# Patient Record
Sex: MALE | Race: WHITE
[De-identification: names, ages, dates, MRNs, and addresses within clinical notes are randomized per-mention and may not be internally consistent; named-entity substitution may affect disease eponyms.]

---

## 2018-02-01 ENCOUNTER — HOSPITAL ENCOUNTER (EMERGENCY)
Dept: HOSPITAL 8 - ED | Age: 23
Discharge: HOME | End: 2018-02-01
Payer: COMMERCIAL

## 2018-02-01 VITALS — DIASTOLIC BLOOD PRESSURE: 86 MMHG | SYSTOLIC BLOOD PRESSURE: 132 MMHG

## 2018-02-01 VITALS — BODY MASS INDEX: 44.64 KG/M2 | HEIGHT: 66 IN | WEIGHT: 277.78 LBS
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
## 2018-02-01 DIAGNOSIS — J20.8: ICD-10-CM
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
## 2018-02-01 DIAGNOSIS — S80.02XA: Primary | ICD-10-CM
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
## 2018-02-01 PROCEDURE — 71045 X-RAY EXAM CHEST 1 VIEW: CPT

## 2018-02-01 PROCEDURE — 99284 EMERGENCY DEPT VISIT MOD MDM: CPT

## 2018-11-23 ENCOUNTER — HOSPITAL ENCOUNTER (EMERGENCY)
Facility: MEDICAL CENTER | Age: 23
End: 2018-11-23
Attending: EMERGENCY MEDICINE

## 2018-11-23 VITALS
BODY MASS INDEX: 38.09 KG/M2 | HEART RATE: 72 BPM | DIASTOLIC BLOOD PRESSURE: 86 MMHG | TEMPERATURE: 98.5 F | SYSTOLIC BLOOD PRESSURE: 125 MMHG | OXYGEN SATURATION: 97 % | HEIGHT: 68 IN | WEIGHT: 251.32 LBS | RESPIRATION RATE: 16 BRPM
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
## 2018-11-23 DIAGNOSIS — L03.011 CELLULITIS OF FINGER OF RIGHT HAND: ICD-10-CM

## 2018-11-23 PROCEDURE — 99283 EMERGENCY DEPT VISIT LOW MDM: CPT

## 2018-11-23 RX ORDER — AMOXICILLIN AND CLAVULANATE POTASSIUM 875; 125 MG/1; MG/1
1 TABLET, FILM COATED ORAL 2 TIMES DAILY
Qty: 20 TAB | Refills: 0 | Status: SHIPPED | OUTPATIENT
Start: 2018-11-23 | End: 2018-12-01

## 2018-11-23 RX ORDER — SULFAMETHOXAZOLE AND TRIMETHOPRIM 800; 160 MG/1; MG/1
1 TABLET ORAL 2 TIMES DAILY
Qty: 20 TAB | Refills: 0 | Status: SHIPPED | OUTPATIENT
Start: 2018-11-23 | End: 2018-12-01

## 2018-11-23 ASSESSMENT — PAIN SCALES - GENERAL
PAINLEVEL_OUTOF10: 3
PAINLEVEL_OUTOF10: 3

## 2018-11-23 NOTE — ED NOTES
Presents describing  a cat bite on his right thumb approximately 3 days ago.  He C/O escalating pain and swelling.

## 2018-11-23 NOTE — ED NOTES
Pt given written and oral discharge instructions. Pt verbalized understanding of all instructions given. All questions answered. Pt given paper prescriptions as ordered and educated on use. Pt given f/u instructions and educated on s/s of when to return to the ER. Pt ambulating independently upon time of discharge in stable condition.

## 2018-11-23 NOTE — ED PROVIDER NOTES
"ED Provider Note    CHIEF COMPLAINT  Chief Complaint   Patient presents with   • Cat Bite        HPI  Yina Oconnor is a 23 y.o. male who presents to the ED complaining of a cat bite to the right thumb.  The patient states he was bit about 3 days ago noticed last night that got significantly bigger he actually was able to pop it and it was draining.  Now is complaining of pain to his right thumb denies fevers chills nausea vomiting or any other symptoms.    REVIEW OF SYSTEMS  See HPI for further details. All other systems are negative.     PAST MEDICAL HISTORY  History reviewed. No pertinent past medical history.    FAMILY HISTORY  History reviewed. No pertinent family history.  Patient's family history has been discussed and is been found to be noncontributory to his present illness  SOCIAL HISTORY  Social History     Social History   • Marital status: Single     Spouse name: N/A   • Number of children: N/A   • Years of education: N/A     Social History Main Topics   • Smoking status: Never Smoker   • Smokeless tobacco: Never Used   • Alcohol use Yes      Comment: Occasionally   • Drug use: No   • Sexual activity: Not on file     Other Topics Concern   • Not on file     Social History Narrative    ** Merged History Encounter **                SURGICAL HISTORY  History reviewed. No pertinent surgical history.    CURRENT MEDICATIONS   Home Medications    **Home medications have not yet been reviewed for this encounter**       None  ALLERGIES   Allergies   Allergen Reactions   • Zinc Swelling       PHYSICAL EXAM  VITAL SIGNS: /93   Pulse 78   Temp 36.6 °C (97.8 °F) (Temporal)   Resp 16   Ht 1.727 m (5' 8\")   Wt 114 kg (251 lb 5.2 oz)   SpO2 98%   BMI 38.21 kg/m²    Pulse Ox interpretation nonhypoxic    Constitutional: Well developed, Well nourished, No acute distress, Non-toxic appearance.   Abdomen: Soft, nontender nondistended. Bowel sounds are present.   Skin: Warm, Dry, No erythema, "   Musculoskeletal: Intact distal pulses, no clubbing, no cyanosis, no edema right thumb at the mid aspect of the thumb itself there is a small puncture wound with a surrounding amount of cellulitis is tender to palpation but the thumb itself is otherwise normal is good flexion-extension has no tenderness over the flexor aspect  Neurologic: Alert & oriented x 3, Normal motor function, Normal sensory function, No focal deficits noted.     RADIOLOGY/PROCEDURES  No orders to display         COURSE & MEDICAL DECISION MAKING  Pertinent Labs & Imaging studies reviewed. (See chart for details)  Patient presents for evaluation.  Currently does have a small area of cellulitis I will start the patient on Augmentin and Bactrim for this.  Patient to continue with warm soaks wound care treatment.  If he gets sniffily worse he is to return back to the ED for reevaluation.  Patient is to follow the primary care physician as needed.    FINAL IMPRESSION  1. Cat bite, initial encounter    2. Cellulitis of finger of right hand           The patient will return for new or worsening symptoms and is stable at the time of discharge.    The patient is referred to a primary physician for blood pressure management, diabetic screening, and for all other preventative health concerns.        DISPOSITION:  Patient will be discharged home in stable condition.    FOLLOW UP:  [facility]  [facility] [facility] [address]  [facility] [address]  [phone]          [address] [address]  [phone]          OUTPATIENT MEDICATIONS:  Discharge Medication List as of 11/23/2018  3:34 PM      START taking these medications    Details   amoxicillin-clavulanate (AUGMENTIN) 875-125 MG Tab Take 1 Tab by mouth 2 times a day for 10 days., Disp-20 Tab, R-0, Print Rx Paper      sulfamethoxazole-trimethoprim (BACTRIM DS) 800-160 MG tablet Take 1 Tab by mouth 2 Times a Day for 10 days., Disp-20 Tab, R-0, Print Rx Paper                      Electronically signed by: Dimitri Mc, 11/23/2018 3:35 PM

## 2018-12-01 ENCOUNTER — HOSPITAL ENCOUNTER (EMERGENCY)
Facility: MEDICAL CENTER | Age: 23
End: 2018-12-01
Attending: EMERGENCY MEDICINE

## 2018-12-01 VITALS
BODY MASS INDEX: 38.72 KG/M2 | HEART RATE: 74 BPM | DIASTOLIC BLOOD PRESSURE: 77 MMHG | HEIGHT: 68 IN | TEMPERATURE: 99.2 F | RESPIRATION RATE: 20 BRPM | WEIGHT: 255.51 LBS | SYSTOLIC BLOOD PRESSURE: 127 MMHG | OXYGEN SATURATION: 96 %

## 2018-12-01 DIAGNOSIS — Z51.89 ENCOUNTER FOR WOUND RE-CHECK: ICD-10-CM
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
## 2018-12-01 DIAGNOSIS — Z76.0 MEDICATION REFILL: ICD-10-CM

## 2018-12-01 PROCEDURE — 99283 EMERGENCY DEPT VISIT LOW MDM: CPT

## 2018-12-01 RX ORDER — SULFAMETHOXAZOLE AND TRIMETHOPRIM 800; 160 MG/1; MG/1
1 TABLET ORAL 2 TIMES DAILY
Qty: 14 TAB | Refills: 0 | Status: SHIPPED | OUTPATIENT
Start: 2018-12-01 | End: 2018-12-08

## 2018-12-01 RX ORDER — AMOXICILLIN AND CLAVULANATE POTASSIUM 875; 125 MG/1; MG/1
1 TABLET, FILM COATED ORAL 2 TIMES DAILY
Qty: 14 TAB | Refills: 0 | Status: SHIPPED | OUTPATIENT
Start: 2018-12-01 | End: 2018-12-08

## 2018-12-01 ASSESSMENT — PAIN SCALES - GENERAL: PAINLEVEL_OUTOF10: 0

## 2018-12-02 NOTE — ED TRIAGE NOTES
"Chief Complaint   Patient presents with   • Medication Refill     pt states his antiboiotics are missing and needs more, was supposed to take antibiotic (unsure which one) but only took for 5-7 days, had tenisha bite about 2 wks ago   /76   Pulse 99   Temp 36.4 °C (97.6 °F) (Temporal)   Resp 16   Ht 1.727 m (5' 8\")   Wt 115.9 kg (255 lb 8.2 oz)   SpO2 96%   BMI 38.85 kg/m²       "

## 2018-12-02 NOTE — ED PROVIDER NOTES
ED Provider Note    CHIEF COMPLAINT   Chief Complaint   Patient presents with   • Medication Refill     pt states his antiboiotics are missing and needs more, was supposed to take antibiotic (unsure which one) but only took for 5-7 days, had tenisha bite about 2 wks ago       HPI   Yina Oconnor is a 23 y.o. male who presents asking for refill of his antibiotics.  The patient suffered a cat bite to the right hand about 1-1/2-2 weeks ago.  He says he took his antibiotics for about 7 days, then lost the rest of them.  He last took the antibiotics 2 days ago.  The patient says he woke up this morning and is hand and thumb were quite swollen but this resolved after about 3 hours.  He denies any significant pain to the thumb or hand at this time.  He said he developed a fever 2 days ago and was sent home from work.  He says today he has had no further fever.  He denies any chills, sore throat, cough, congestion, or any difficulty breathing.  He has had no nausea, vomiting, or diarrhea.  He denies any urinary symptoms.  The patient says he would likely go back to work and needs a work note.  He would also like to finish his antibiotics as he was concerned the infection was coming back because of the swelling earlier today.    REVIEW OF SYSTEMS   See HPI for further details. All other systems are negative.     PAST MEDICAL HISTORY   History reviewed. No pertinent past medical history.    FAMILY HISTORY  History reviewed. No pertinent family history.    SOCIAL HISTORY  Social History     Social History   • Marital status: Single     Spouse name: N/A   • Number of children: N/A   • Years of education: N/A     Social History Main Topics   • Smoking status: Current Every Day Smoker     Packs/day: 0.25   • Smokeless tobacco: Never Used   • Alcohol use Yes      Comment: couple drinks per month   • Drug use: No   • Sexual activity: Not on file     Other Topics Concern   • Not on file     Social History Narrative    ** Merged  "History Encounter **            SURGICAL HISTORY  History reviewed. No pertinent surgical history.    CURRENT MEDICATIONS   Home Medications     Reviewed by Sia Martinez R.N. (Registered Nurse) on 12/01/18 at 1845  Med List Status: Partial   Medication Last Dose Status   amoxicillin-clavulanate (AUGMENTIN) 875-125 MG Tab 11/28/2018 Active   hydrocodone-acetaminophen (NORCO) 5-325 MG TABS per tablet  Active   sulfamethoxazole-trimethoprim (BACTRIM DS) 800-160 MG tablet 11/28/2018 Active                ALLERGIES   Allergies   Allergen Reactions   • Zinc Swelling       PHYSICAL EXAM  VITAL SIGNS: /76   Pulse 99   Temp 36.4 °C (97.6 °F) (Temporal)   Resp 16   Ht 1.727 m (5' 8\")   Wt 115.9 kg (255 lb 8.2 oz)   SpO2 96%   BMI 38.85 kg/m²   Constitutional: Well developed, Well nourished, No acute distress, Non-toxic appearance.   Extremities: Intact peripheral pulses, no edema.  Exam is notable for the right upper extremity.  On the volar surface of the right thumb just below the interphalangeal crease, there is a small 1 cm slightly erythematous circular area.  There is no increased warmth or induration.  There is good range of motion on flexion/extension at the interphalangeal joint and metacarpophalangeal joint.  Over the volar surface of the wrist there are two faded erythematous areas about 2 cm in diameter.  No induration, increased warmth, or tenderness.  There is good range of motion on flexion/extension of all fingers at all joints, and to the wrist and elbow.         RADIOLOGY/PROCEDURES      COURSE & MEDICAL DECISION MAKING  Pertinent Labs & Imaging studies reviewed. (See chart for details)  The patient presents with the above complaints.  At this time his wound appears to be healing well.  There is no evidence of any significant cellulitis or tenosynovitis.  I am uncertain what caused his fever 2 days ago.  He has had no respiratory GI symptoms.  The patient says he is feeling much better and " would like a note to go back to work.  I will continue the patient on a one-week course of Augmentin and Bactrim which he was on previously to complete his course of antibiotics.  Patient is told return to the ER for any worsening pain, redness, swelling, fever, or any other problems.  He is given a work release note.    FINAL IMPRESSION  1.  Bite to the right hand  2.  Wound recheck  3.      Electronically signed by: Igor Kumar, 12/1/2018 8:02 PM

## 2018-12-02 NOTE — ED NOTES
Pt given discharge paperwork and prescriptions, pt verbalized understanding all information given, Pt ambulated out of the ER without difficulty.

## 2019-03-16 ENCOUNTER — APPOINTMENT (OUTPATIENT)
Dept: RADIOLOGY | Facility: MEDICAL CENTER | Age: 24
End: 2019-03-16
Attending: EMERGENCY MEDICINE

## 2019-03-16 ENCOUNTER — HOSPITAL ENCOUNTER (EMERGENCY)
Facility: MEDICAL CENTER | Age: 24
End: 2019-03-16
Attending: EMERGENCY MEDICINE

## 2019-03-16 VITALS
HEART RATE: 85 BPM | OXYGEN SATURATION: 94 % | HEIGHT: 68 IN | SYSTOLIC BLOOD PRESSURE: 113 MMHG | DIASTOLIC BLOOD PRESSURE: 66 MMHG | WEIGHT: 257.94 LBS | TEMPERATURE: 97.6 F | BODY MASS INDEX: 39.09 KG/M2 | RESPIRATION RATE: 18 BRPM

## 2019-03-16 DIAGNOSIS — R19.7 VOMITING AND DIARRHEA: ICD-10-CM

## 2019-03-16 DIAGNOSIS — R10.11 RUQ ABDOMINAL PAIN: ICD-10-CM

## 2019-03-16 DIAGNOSIS — R11.10 VOMITING AND DIARRHEA: ICD-10-CM

## 2019-03-16 LAB
ALBUMIN SERPL BCP-MCNC: 4.3 G/DL (ref 3.2–4.9)
ALBUMIN/GLOB SERPL: 1.4 G/DL
ALP SERPL-CCNC: 71 U/L (ref 30–99)
ALT SERPL-CCNC: 21 U/L (ref 2–50)
ANION GAP SERPL CALC-SCNC: 9 MMOL/L (ref 0–11.9)
APPEARANCE UR: CLEAR
AST SERPL-CCNC: 19 U/L (ref 12–45)
BASOPHILS # BLD AUTO: 0.6 % (ref 0–1.8)
BASOPHILS # BLD: 0.05 K/UL (ref 0–0.12)
BILIRUB SERPL-MCNC: 1.7 MG/DL (ref 0.1–1.5)
BILIRUB UR QL STRIP.AUTO: NEGATIVE
BUN SERPL-MCNC: 14 MG/DL (ref 8–22)
CALCIUM SERPL-MCNC: 9.1 MG/DL (ref 8.4–10.2)
CHLORIDE SERPL-SCNC: 105 MMOL/L (ref 96–112)
CO2 SERPL-SCNC: 23 MMOL/L (ref 20–33)
COLOR UR: YELLOW
CREAT SERPL-MCNC: 0.98 MG/DL (ref 0.5–1.4)
EOSINOPHIL # BLD AUTO: 0.14 K/UL (ref 0–0.51)
EOSINOPHIL NFR BLD: 1.8 % (ref 0–6.9)
ERYTHROCYTE [DISTWIDTH] IN BLOOD BY AUTOMATED COUNT: 37.7 FL (ref 35.9–50)
FLUAV RNA SPEC QL NAA+PROBE: NEGATIVE
FLUBV RNA SPEC QL NAA+PROBE: NEGATIVE
GLOBULIN SER CALC-MCNC: 3 G/DL (ref 1.9–3.5)
GLUCOSE SERPL-MCNC: 99 MG/DL (ref 65–99)
GLUCOSE UR STRIP.AUTO-MCNC: NEGATIVE MG/DL
HCT VFR BLD AUTO: 50.5 % (ref 42–52)
HGB BLD-MCNC: 17.8 G/DL (ref 14–18)
IMM GRANULOCYTES # BLD AUTO: 0.04 K/UL (ref 0–0.11)
IMM GRANULOCYTES NFR BLD AUTO: 0.5 % (ref 0–0.9)
KETONES UR STRIP.AUTO-MCNC: NEGATIVE MG/DL
LEUKOCYTE ESTERASE UR QL STRIP.AUTO: NEGATIVE
LIPASE SERPL-CCNC: 35 U/L (ref 7–58)
LYMPHOCYTES # BLD AUTO: 1.76 K/UL (ref 1–4.8)
LYMPHOCYTES NFR BLD: 22.8 % (ref 22–41)
MCH RBC QN AUTO: 29.2 PG (ref 27–33)
MCHC RBC AUTO-ENTMCNC: 35.2 G/DL (ref 33.7–35.3)
MCV RBC AUTO: 82.8 FL (ref 81.4–97.8)
MICRO URNS: NORMAL
MONOCYTES # BLD AUTO: 0.54 K/UL (ref 0–0.85)
MONOCYTES NFR BLD AUTO: 7 % (ref 0–13.4)
NEUTROPHILS # BLD AUTO: 5.2 K/UL (ref 1.82–7.42)
NEUTROPHILS NFR BLD: 67.3 % (ref 44–72)
NITRITE UR QL STRIP.AUTO: NEGATIVE
NRBC # BLD AUTO: 0 K/UL
NRBC BLD-RTO: 0 /100 WBC
PH UR STRIP.AUTO: 7 [PH]
PLATELET # BLD AUTO: 216 K/UL (ref 164–446)
PMV BLD AUTO: 9.9 FL (ref 9–12.9)
POTASSIUM SERPL-SCNC: 3.8 MMOL/L (ref 3.6–5.5)
PROT SERPL-MCNC: 7.3 G/DL (ref 6–8.2)
PROT UR QL STRIP: NEGATIVE MG/DL
RBC # BLD AUTO: 6.1 M/UL (ref 4.7–6.1)
RBC UR QL AUTO: NEGATIVE
SODIUM SERPL-SCNC: 137 MMOL/L (ref 135–145)
SP GR UR STRIP.AUTO: 1.01
WBC # BLD AUTO: 7.7 K/UL (ref 4.8–10.8)

## 2019-03-16 PROCEDURE — 85025 COMPLETE CBC W/AUTO DIFF WBC: CPT

## 2019-03-16 PROCEDURE — 96375 TX/PRO/DX INJ NEW DRUG ADDON: CPT

## 2019-03-16 PROCEDURE — 36415 COLL VENOUS BLD VENIPUNCTURE: CPT

## 2019-03-16 PROCEDURE — 83690 ASSAY OF LIPASE: CPT

## 2019-03-16 PROCEDURE — 81003 URINALYSIS AUTO W/O SCOPE: CPT

## 2019-03-16 PROCEDURE — 700111 HCHG RX REV CODE 636 W/ 250 OVERRIDE (IP): Performed by: EMERGENCY MEDICINE

## 2019-03-16 PROCEDURE — 700105 HCHG RX REV CODE 258: Performed by: EMERGENCY MEDICINE

## 2019-03-16 PROCEDURE — 80053 COMPREHEN METABOLIC PANEL: CPT

## 2019-03-16 PROCEDURE — 76705 ECHO EXAM OF ABDOMEN: CPT

## 2019-03-16 PROCEDURE — 96374 THER/PROPH/DIAG INJ IV PUSH: CPT

## 2019-03-16 PROCEDURE — 99285 EMERGENCY DEPT VISIT HI MDM: CPT

## 2019-03-16 PROCEDURE — 87502 INFLUENZA DNA AMP PROBE: CPT

## 2019-03-16 RX ORDER — KETOROLAC TROMETHAMINE 30 MG/ML
15 INJECTION, SOLUTION INTRAMUSCULAR; INTRAVENOUS ONCE
Status: COMPLETED | OUTPATIENT
Start: 2019-03-16 | End: 2019-03-16

## 2019-03-16 RX ORDER — ONDANSETRON 4 MG/1
4 TABLET, ORALLY DISINTEGRATING ORAL EVERY 6 HOURS PRN
Qty: 10 TAB | Refills: 0 | Status: SHIPPED | OUTPATIENT
Start: 2019-03-16 | End: 2019-06-17

## 2019-03-16 RX ORDER — ONDANSETRON 2 MG/ML
4 INJECTION INTRAMUSCULAR; INTRAVENOUS ONCE
Status: COMPLETED | OUTPATIENT
Start: 2019-03-16 | End: 2019-03-16

## 2019-03-16 RX ORDER — SODIUM CHLORIDE 9 MG/ML
1000 INJECTION, SOLUTION INTRAVENOUS ONCE
Status: COMPLETED | OUTPATIENT
Start: 2019-03-16 | End: 2019-03-16

## 2019-03-16 RX ADMIN — SODIUM CHLORIDE 1000 ML: 9 INJECTION, SOLUTION INTRAVENOUS at 18:49

## 2019-03-16 RX ADMIN — ONDANSETRON 4 MG: 2 INJECTION INTRAMUSCULAR; INTRAVENOUS at 18:49

## 2019-03-16 RX ADMIN — KETOROLAC TROMETHAMINE 15 MG: 30 INJECTION, SOLUTION INTRAMUSCULAR; INTRAVENOUS at 18:49

## 2019-03-16 ASSESSMENT — ENCOUNTER SYMPTOMS
SORE THROAT: 1
EYE REDNESS: 0
NECK PAIN: 0
ABDOMINAL PAIN: 1
HEADACHES: 1
BLURRED VISION: 0
SEIZURES: 0
CHILLS: 0
NAUSEA: 1
FOCAL WEAKNESS: 0
VOMITING: 1
COUGH: 1
BACK PAIN: 0
SHORTNESS OF BREATH: 0
FEVER: 1

## 2019-03-17 NOTE — ED TRIAGE NOTES
"Pt presents complaining of acute onset of RUQ abdominal pain with associated episodic N/V/D persisting for the past 4 days.    Chief Complaint   Patient presents with   • Fever   • Nausea/Vomiting/Diarrhea   • RUQ Pain     /69   Pulse 80   Temp 36.4 °C (97.6 °F) (Temporal)   Resp 20   Ht 1.727 m (5' 8\")   Wt 117 kg (257 lb 15 oz)   SpO2 99%   BMI 39.22 kg/m²     "

## 2019-03-17 NOTE — DISCHARGE INSTRUCTIONS
You were seen in the Emergency Department for abdominal pain, vomiting and diarrhea, likely due to viral illness.    Labs, urine test, ultrasound were completed without significant acute abnormalities.    Please use 1,000mg of tylenol or 600mg of ibuprofen every 6 hours as needed for pain.  Please drink plenty of fluids.    Please follow up with your primary care physician.    Return to the Emergency Department with persistent fevers greater than 100.4, worsening abdominal pain, persistent vomiting, or other concerns.

## 2019-03-17 NOTE — ED PROVIDER NOTES
ED Provider Note    CHIEF COMPLAINT  Chief Complaint   Patient presents with   • Fever   • Nausea/Vomiting/Diarrhea   • RUQ Pain       HPI  Yina Sierra is a 23 y.o. otherwise healthy male who presents with abdominal pain, vomiting and diarrhea.  Patient states he has been sick for the last 5 days.  He endorses fevers at home as high as 101 on Thursday.  He describes pain mainly located to the right upper quadrant however radiating across the left upper quadrant that is sharp and cramping in nature.  He endorses multiple episodes of vomiting and diarrhea, some mild dysuria.  He has had a cough and sore throat as well.  No known sick contacts.  Patient states he did receive a flu vaccination this year.  He also states that he has been using psychedelic mushrooms frequently recently.  He states the week before last he use them every day for a week straight.  Other than marijuana, he denies other drug use.    REVIEW OF SYSTEMS  See HPI for further details.   Review of Systems   Constitutional: Positive for fever. Negative for chills.   HENT: Positive for sore throat.    Eyes: Negative for blurred vision and redness.   Respiratory: Positive for cough. Negative for shortness of breath.    Cardiovascular: Negative for chest pain and leg swelling.   Gastrointestinal: Positive for abdominal pain, nausea and vomiting.   Genitourinary: Positive for dysuria. Negative for urgency.   Musculoskeletal: Negative for back pain and neck pain.   Skin: Negative for rash.   Neurological: Positive for headaches. Negative for focal weakness and seizures.   Psychiatric/Behavioral: Negative for suicidal ideas.         PAST MEDICAL HISTORY       SOCIAL HISTORY  Social History     Social History Main Topics   • Smoking status: Current Every Day Smoker     Packs/day: 0.25     Types: Cigarettes   • Smokeless tobacco: Never Used   • Alcohol use Yes      Comment: Occasionally   • Drug use: No   • Sexual activity: Not on file       SURGICAL  "HISTORY  patient denies any surgical history    CURRENT MEDICATIONS  Home Medications     Reviewed by Jonatan Cleaning R.N. (Registered Nurse) on 03/16/19 at 1818  Med List Status: Not Addressed   Medication Last Dose Status   hydrocodone-acetaminophen (NORCO) 5-325 MG TABS per tablet Not taking Active                ALLERGIES  Allergies   Allergen Reactions   • Zinc Swelling       PHYSICAL EXAM   VITAL SIGNS: /66   Pulse 85   Temp 36.4 °C (97.6 °F) (Temporal)   Resp 18   Ht 1.727 m (5' 8\")   Wt 117 kg (257 lb 15 oz)   SpO2 94%   BMI 39.22 kg/m²      Physical Exam   Constitutional: He is oriented to person, place, and time and well-developed, well-nourished, and in no distress. No distress.   Nontoxic appearing young male   HENT:   Head: Normocephalic and atraumatic.   Eyes: Pupils are equal, round, and reactive to light. Conjunctivae are normal.   Neck: Normal range of motion. Neck supple.   Cardiovascular: Normal rate, regular rhythm and normal heart sounds.    Pulmonary/Chest: Effort normal and breath sounds normal. No respiratory distress.   Abdominal: Soft. He exhibits no distension. There is tenderness. There is guarding. There is no rebound.   Tenderness to palpation in bilateral upper quadrants, worse on the right than the left with voluntary guarding   Musculoskeletal: Normal range of motion. He exhibits no edema or tenderness.   Neurological: He is alert and oriented to person, place, and time.   Moving all extremities spontaneously   Skin: Skin is warm and dry.   Psychiatric: Affect normal.         DIAGNOSTIC STUDIES      LABS  Personally reviewed by me  Labs Reviewed   COMP METABOLIC PANEL - Abnormal; Notable for the following:        Result Value    Total Bilirubin 1.7 (*)     All other components within normal limits   CBC WITH DIFFERENTIAL   LIPASE   URINALYSIS,CULTURE IF INDICATED   INFLUENZA A/B BY PCR   ESTIMATED GFR   INFLUENZA RAPID           RADIOLOGY  Personally reviewed by " "me  US-RUQ   Final Result      1.  Fatty infiltration of liver and mild hepatomegaly      2.  Negative for gallstones or biliary dilation          ED COURSE  Vitals:    03/16/19 1813 03/16/19 1814 03/16/19 2015   BP:  129/69 113/66   Pulse:  80 85   Resp:  20 18   Temp:  36.4 °C (97.6 °F)    TempSrc:  Temporal    SpO2:  99% 94%   Weight: 117 kg (257 lb 15 oz)     Height: 1.727 m (5' 8\")           Medications administered:  Medications   NS infusion 1,000 mL (0 mL Intravenous Stopped 3/16/19 1900)   ondansetron (ZOFRAN) syringe/vial injection 4 mg (4 mg Intravenous Given 3/16/19 1849)   ketorolac (TORADOL) injection 15 mg (15 mg Intravenous Given 3/16/19 1849)     Patient was given IV fluids for vomiting and possible dehydration.  IV hydration was used because oral hydration was not adequate alone.  Following fluid administration patient's symptoms and hydration status were improved.      MEDICAL DECISION MAKING  Otherwise healthy patient presents with 4 to 5-day history of vomiting, diarrhea, and abdominal pain.  He is afebrile with normal vitals on arrival and nontoxic-appearing.  Abdominal exam is not concerning for bowel obstruction or perforation, appendicitis, or diverticulitis.  Labs are reassuring without evidence of leukocytosis.  There is no evidence of transaminitis or elevated lipase concerning for pancreatitis.  Ultrasound was performed without evidence of cholelithiasis or cholecystitis.  Urinalysis is negative for signs of infection.  Influenza test is negative.  I suspect the patient's symptoms are due to a viral illness.  We will plan for symptom control followed by reassessment.    Upon reassessment, patient is resting comfortably with normal vital signs.  No new complaints at this time.  Repeat abdominal exam at this time is benign.  He is tolerating water without difficulty.  Discussed results with patient and/or family as well as importance of primary care follow up.  Patient understands plan of " care and strict return precautions for new or changing symptoms.       IMPRESSION  (R10.11) RUQ abdominal pain  (R11.10,  R19.7) Vomiting and diarrhea    Disposition: Discharge home, stable condition  Results, diagnoses, and treatment options were discussed with the patient and/or family. Patient verbalized understanding of plan of care.    Patient referred to primary care provider for monitoring and treatment of blood pressure.      Discharge Medication List as of 3/16/2019  8:25 PM      START taking these medications    Details   ondansetron (ZOFRAN ODT) 4 MG TABLET DISPERSIBLE Take 1 Tab by mouth every 6 hours as needed for Nausea., Disp-10 Tab, R-0, Print Rx Paper                 Electronically signed by: Jossie Mix, 3/16/2019 6:42 PM

## 2019-06-17 ENCOUNTER — HOSPITAL ENCOUNTER (EMERGENCY)
Facility: MEDICAL CENTER | Age: 24
End: 2019-06-17
Attending: EMERGENCY MEDICINE

## 2019-06-17 VITALS
SYSTOLIC BLOOD PRESSURE: 131 MMHG | DIASTOLIC BLOOD PRESSURE: 81 MMHG | HEIGHT: 67 IN | BODY MASS INDEX: 38.06 KG/M2 | TEMPERATURE: 97.2 F | OXYGEN SATURATION: 96 % | HEART RATE: 87 BPM | WEIGHT: 242.51 LBS | RESPIRATION RATE: 16 BRPM

## 2019-06-17 DIAGNOSIS — R19.7 VOMITING AND DIARRHEA: ICD-10-CM

## 2019-06-17 DIAGNOSIS — R11.10 VOMITING AND DIARRHEA: ICD-10-CM

## 2019-06-17 PROCEDURE — 700111 HCHG RX REV CODE 636 W/ 250 OVERRIDE (IP): Performed by: EMERGENCY MEDICINE

## 2019-06-17 PROCEDURE — 99284 EMERGENCY DEPT VISIT MOD MDM: CPT

## 2019-06-17 RX ORDER — ONDANSETRON 4 MG/1
4 TABLET, ORALLY DISINTEGRATING ORAL ONCE
Status: COMPLETED | OUTPATIENT
Start: 2019-06-17 | End: 2019-06-17

## 2019-06-17 RX ORDER — ONDANSETRON 4 MG/1
4 TABLET, ORALLY DISINTEGRATING ORAL EVERY 8 HOURS PRN
Qty: 10 TAB | Refills: 0 | Status: SHIPPED | OUTPATIENT
Start: 2019-06-17 | End: 2019-12-11

## 2019-06-17 RX ADMIN — ONDANSETRON 4 MG: 4 TABLET, ORALLY DISINTEGRATING ORAL at 07:53

## 2019-06-17 NOTE — ED NOTES
Reviewed discharge instruction and printed prescription for Zofran w/ pt, verbalized understanding to information provided including return precautions, denied questions/concerns.  Pt denied c/o nausea or pain at time of discharge.  Pt ambulated from ED w/ friend.

## 2019-06-17 NOTE — ED NOTES
Pt medicated w/ Zofran after completing phone conversation w/ friend.  Pt currently rates pain at 2/10.

## 2019-06-17 NOTE — ED PROVIDER NOTES
"ED Provider Note    CHIEF COMPLAINT  Abdominal pain      HPI  Yina Edgar is a 23 y.o. male who presents with abdominal pain.  Started about 2 this morning while he was at work.  Cramping character.  Seems to wax and wane.  He points to his upper abdomen as the location for the pain.  It is not worse on the right or the left.  He has not had a fever or chills, but he always feels hot while he is work.  There is associated nausea.  He has vomited multiple times.  He has had at least 6 episodes of watery diarrhea.  No blood in the emesis and it appeared to be everything that he had eaten.  Denies chest or shortness of breath.  He denies any abnormal foods, but he reports he may have been having more sweets than normal over the last week.  No known ill exposure.    Medical record reviewed patient had a visit to the emergency department in March of this year with right upper quadrant abdominal pain.  Source of symptoms was not identified.    REVIEW OF SYSTEMS  As above  All other systems are negative.     PAST MEDICAL HISTORY  Denies chronic medical problems    FAMILY HISTORY  History reviewed. No pertinent family history.    SOCIAL HISTORY  Positive tobacco.  Positive marijuana daily    SURGICAL HISTORY  History reviewed. No pertinent surgical history.    CURRENT MEDICATIONS  Home Medications    **Home medications have not yet been reviewed for this encounter**         ALLERGIES  Allergies   Allergen Reactions   • Zinc Swelling       PHYSICAL EXAM  VITAL SIGNS: /81   Pulse 100   Temp 36.9 °C (98.5 °F) (Temporal)   Resp 16   Ht 1.702 m (5' 7\")   Wt 110 kg (242 lb 8.1 oz)   SpO2 96%   BMI 37.98 kg/m²   Constitutional: Awake and alert  HENT: Moist mucous membranes  Eyes: Sclera white  Neck: Normal range of motion  Cardiovascular: Tachycardic  heart rate, Normal rhythm  Thorax & Lungs: Normal breath sounds, No respiratory distress, No wheezing, No chest tenderness.   Abdomen: Soft, nondistended, reports " discomfort throughout with palpation.  No focal tenderness.  No rebound or peritonitis  Skin: No rash.   Back: No tenderness, No CVA tenderness.   Extremities: Intact, symmetric distal pulses, no edema.  Neurologic: Grossly normal    COURSE & MEDICAL DECISION MAKING  Young male presents with vomiting and diarrhea with nonlocalized abdominal pain.  He does not have tenderness in his right lower quadrant.  He is afebrile.  No distention or suggestion of obstruction or surgical abdomen.  He was given oral Zofran in the ER.  Observed.  He is drinking liquids and ate some crackers.  Reports that he felt much better.  At this point there is no indication for immediate emergency department work-up.  He does present very early in the course of his illness and understands that if he has localization of pain to the right lower quadrant, fevers or no improvement within the next 24 hours he needs to return for recheck.  I have given a prescription for Zofran for nausea.  Advised plenty fluids and bland diet.    FINAL IMPRESSION  1.  Abdominal pain, unspecified  2.  Vomiting and diarrhea        This dictation was created using voice recognition software. The accuracy of the dictation is limited to the abilities of the software.  The nursing notes were reviewed and certain aspects of this information were incorporated into this note.    Electronically signed by: Sanjay Green, 6/17/2019 8:03 AM

## 2019-06-17 NOTE — ED TRIAGE NOTES
Chief Complaint   Patient presents with   • Abdominal Pain     low abd pain with an onset at 2 am, + vomiting, + diarrhea

## 2019-07-20 ENCOUNTER — HOSPITAL ENCOUNTER (EMERGENCY)
Facility: MEDICAL CENTER | Age: 24
End: 2019-07-20
Attending: EMERGENCY MEDICINE

## 2019-07-20 ENCOUNTER — APPOINTMENT (OUTPATIENT)
Dept: RADIOLOGY | Facility: MEDICAL CENTER | Age: 24
End: 2019-07-20
Attending: EMERGENCY MEDICINE

## 2019-07-20 VITALS
DIASTOLIC BLOOD PRESSURE: 82 MMHG | HEART RATE: 86 BPM | SYSTOLIC BLOOD PRESSURE: 121 MMHG | TEMPERATURE: 98 F | HEIGHT: 67 IN | BODY MASS INDEX: 36.23 KG/M2 | WEIGHT: 230.82 LBS | RESPIRATION RATE: 18 BRPM | OXYGEN SATURATION: 95 %

## 2019-07-20 DIAGNOSIS — R10.30 LOWER ABDOMINAL PAIN: ICD-10-CM

## 2019-07-20 DIAGNOSIS — N50.819 TESTICLE PAIN: ICD-10-CM

## 2019-07-20 LAB
ANION GAP SERPL CALC-SCNC: 4 MMOL/L (ref 0–11.9)
APPEARANCE UR: CLEAR
BASOPHILS # BLD AUTO: 0.8 % (ref 0–1.8)
BASOPHILS # BLD: 0.07 K/UL (ref 0–0.12)
BILIRUB UR QL STRIP.AUTO: NEGATIVE
BUN SERPL-MCNC: 8 MG/DL (ref 8–22)
C TRACH DNA SPEC QL NAA+PROBE: NEGATIVE
CALCIUM SERPL-MCNC: 8.1 MG/DL (ref 8.4–10.2)
CHLORIDE SERPL-SCNC: 110 MMOL/L (ref 96–112)
CO2 SERPL-SCNC: 23 MMOL/L (ref 20–33)
COLOR UR: YELLOW
CREAT SERPL-MCNC: 0.97 MG/DL (ref 0.5–1.4)
EOSINOPHIL # BLD AUTO: 0.26 K/UL (ref 0–0.51)
EOSINOPHIL NFR BLD: 2.9 % (ref 0–6.9)
EPI CELLS #/AREA URNS HPF: ABNORMAL /HPF
ERYTHROCYTE [DISTWIDTH] IN BLOOD BY AUTOMATED COUNT: 38.7 FL (ref 35.9–50)
GLUCOSE SERPL-MCNC: 100 MG/DL (ref 65–99)
GLUCOSE UR STRIP.AUTO-MCNC: NEGATIVE MG/DL
HCT VFR BLD AUTO: 46.5 % (ref 42–52)
HGB BLD-MCNC: 16 G/DL (ref 14–18)
IMM GRANULOCYTES # BLD AUTO: 0.02 K/UL (ref 0–0.11)
IMM GRANULOCYTES NFR BLD AUTO: 0.2 % (ref 0–0.9)
KETONES UR STRIP.AUTO-MCNC: NEGATIVE MG/DL
LEUKOCYTE ESTERASE UR QL STRIP.AUTO: ABNORMAL
LYMPHOCYTES # BLD AUTO: 2.74 K/UL (ref 1–4.8)
LYMPHOCYTES NFR BLD: 30.5 % (ref 22–41)
MCH RBC QN AUTO: 29.2 PG (ref 27–33)
MCHC RBC AUTO-ENTMCNC: 34.4 G/DL (ref 33.7–35.3)
MCV RBC AUTO: 84.9 FL (ref 81.4–97.8)
MICRO URNS: ABNORMAL
MONOCYTES # BLD AUTO: 0.72 K/UL (ref 0–0.85)
MONOCYTES NFR BLD AUTO: 8 % (ref 0–13.4)
MUCOUS THREADS #/AREA URNS HPF: ABNORMAL /HPF
N GONORRHOEA DNA SPEC QL NAA+PROBE: NEGATIVE
NEUTROPHILS # BLD AUTO: 5.16 K/UL (ref 1.82–7.42)
NEUTROPHILS NFR BLD: 57.6 % (ref 44–72)
NITRITE UR QL STRIP.AUTO: NEGATIVE
NRBC # BLD AUTO: 0 K/UL
NRBC BLD-RTO: 0 /100 WBC
PH UR STRIP.AUTO: 6 [PH]
PLATELET # BLD AUTO: 232 K/UL (ref 164–446)
PMV BLD AUTO: 10.4 FL (ref 9–12.9)
POTASSIUM SERPL-SCNC: 3.9 MMOL/L (ref 3.6–5.5)
PROT UR QL STRIP: NEGATIVE MG/DL
RBC # BLD AUTO: 5.48 M/UL (ref 4.7–6.1)
RBC UR QL AUTO: NEGATIVE
SODIUM SERPL-SCNC: 137 MMOL/L (ref 135–145)
SP GR UR STRIP.AUTO: 1.02
SPECIMEN SOURCE: NORMAL
WBC # BLD AUTO: 9 K/UL (ref 4.8–10.8)
WBC #/AREA URNS HPF: ABNORMAL /HPF

## 2019-07-20 PROCEDURE — 99284 EMERGENCY DEPT VISIT MOD MDM: CPT

## 2019-07-20 PROCEDURE — 93975 VASCULAR STUDY: CPT

## 2019-07-20 PROCEDURE — A9270 NON-COVERED ITEM OR SERVICE: HCPCS | Performed by: EMERGENCY MEDICINE

## 2019-07-20 PROCEDURE — 700101 HCHG RX REV CODE 250: Performed by: EMERGENCY MEDICINE

## 2019-07-20 PROCEDURE — 87591 N.GONORRHOEAE DNA AMP PROB: CPT

## 2019-07-20 PROCEDURE — 85025 COMPLETE CBC W/AUTO DIFF WBC: CPT

## 2019-07-20 PROCEDURE — 700111 HCHG RX REV CODE 636 W/ 250 OVERRIDE (IP): Performed by: EMERGENCY MEDICINE

## 2019-07-20 PROCEDURE — 87491 CHLMYD TRACH DNA AMP PROBE: CPT

## 2019-07-20 PROCEDURE — 36415 COLL VENOUS BLD VENIPUNCTURE: CPT

## 2019-07-20 PROCEDURE — 96372 THER/PROPH/DIAG INJ SC/IM: CPT

## 2019-07-20 PROCEDURE — 80048 BASIC METABOLIC PNL TOTAL CA: CPT

## 2019-07-20 PROCEDURE — 81001 URINALYSIS AUTO W/SCOPE: CPT

## 2019-07-20 PROCEDURE — 700102 HCHG RX REV CODE 250 W/ 637 OVERRIDE(OP): Performed by: EMERGENCY MEDICINE

## 2019-07-20 RX ORDER — IBUPROFEN 600 MG/1
600 TABLET ORAL ONCE
Status: COMPLETED | OUTPATIENT
Start: 2019-07-20 | End: 2019-07-20

## 2019-07-20 RX ORDER — AZITHROMYCIN 250 MG/1
TABLET, FILM COATED ORAL
Status: COMPLETED
Start: 2019-07-20 | End: 2019-07-20

## 2019-07-20 RX ORDER — LIDOCAINE HYDROCHLORIDE 10 MG/ML
1 INJECTION, SOLUTION INFILTRATION; PERINEURAL ONCE
Status: COMPLETED | OUTPATIENT
Start: 2019-07-20 | End: 2019-07-20

## 2019-07-20 RX ORDER — ACETAMINOPHEN 325 MG/1
650 TABLET ORAL ONCE
Status: COMPLETED | OUTPATIENT
Start: 2019-07-20 | End: 2019-07-20

## 2019-07-20 RX ORDER — ACETAMINOPHEN 325 MG/1
325 TABLET ORAL EVERY 4 HOURS PRN
Qty: 30 TAB | Refills: 0 | Status: SHIPPED | OUTPATIENT
Start: 2019-07-20 | End: 2019-12-11

## 2019-07-20 RX ORDER — ONDANSETRON 4 MG/1
4 TABLET, ORALLY DISINTEGRATING ORAL ONCE
Status: COMPLETED | OUTPATIENT
Start: 2019-07-20 | End: 2019-07-20

## 2019-07-20 RX ORDER — CEFTRIAXONE 500 MG/1
250 INJECTION, POWDER, FOR SOLUTION INTRAMUSCULAR; INTRAVENOUS ONCE
Status: COMPLETED | OUTPATIENT
Start: 2019-07-20 | End: 2019-07-20

## 2019-07-20 RX ORDER — IBUPROFEN 600 MG/1
600 TABLET ORAL EVERY 6 HOURS PRN
Qty: 30 TAB | Refills: 0 | Status: SHIPPED | OUTPATIENT
Start: 2019-07-20 | End: 2019-12-11

## 2019-07-20 RX ORDER — AZITHROMYCIN 250 MG/1
1000 TABLET, FILM COATED ORAL ONCE
Status: COMPLETED | OUTPATIENT
Start: 2019-07-20 | End: 2019-07-20

## 2019-07-20 RX ADMIN — IBUPROFEN 600 MG: 600 TABLET ORAL at 01:23

## 2019-07-20 RX ADMIN — LIDOCAINE HYDROCHLORIDE 1 ML: 10 INJECTION, SOLUTION INFILTRATION; PERINEURAL at 01:30

## 2019-07-20 RX ADMIN — ONDANSETRON 4 MG: 4 TABLET, ORALLY DISINTEGRATING ORAL at 01:18

## 2019-07-20 RX ADMIN — CEFTRIAXONE SODIUM 250 MG: 500 INJECTION, POWDER, FOR SOLUTION INTRAMUSCULAR; INTRAVENOUS at 01:19

## 2019-07-20 RX ADMIN — ACETAMINOPHEN 650 MG: 325 TABLET, FILM COATED ORAL at 01:25

## 2019-07-20 RX ADMIN — AZITHROMYCIN 1000 MG: 250 TABLET, FILM COATED ORAL at 01:23

## 2019-11-03 ENCOUNTER — HOSPITAL ENCOUNTER (EMERGENCY)
Dept: HOSPITAL 8 - ED | Age: 24
Discharge: HOME | End: 2019-11-03
Payer: MEDICAID

## 2019-11-03 VITALS — BODY MASS INDEX: 32.32 KG/M2 | WEIGHT: 205.91 LBS | HEIGHT: 67 IN

## 2019-11-03 VITALS — SYSTOLIC BLOOD PRESSURE: 151 MMHG | DIASTOLIC BLOOD PRESSURE: 79 MMHG

## 2019-11-03 DIAGNOSIS — K02.9: ICD-10-CM

## 2019-11-03 DIAGNOSIS — K04.7: Primary | ICD-10-CM

## 2019-11-03 PROCEDURE — 99283 EMERGENCY DEPT VISIT LOW MDM: CPT

## 2019-11-16 ENCOUNTER — APPOINTMENT (OUTPATIENT)
Dept: RADIOLOGY | Facility: MEDICAL CENTER | Age: 24
End: 2019-11-16
Attending: EMERGENCY MEDICINE
Payer: MEDICAID

## 2019-11-16 ENCOUNTER — HOSPITAL ENCOUNTER (EMERGENCY)
Facility: MEDICAL CENTER | Age: 24
End: 2019-11-16
Attending: EMERGENCY MEDICINE
Payer: MEDICAID

## 2019-11-16 VITALS
TEMPERATURE: 96.8 F | BODY MASS INDEX: 33.41 KG/M2 | OXYGEN SATURATION: 99 % | RESPIRATION RATE: 16 BRPM | WEIGHT: 207.89 LBS | HEIGHT: 66 IN | DIASTOLIC BLOOD PRESSURE: 72 MMHG | HEART RATE: 74 BPM | SYSTOLIC BLOOD PRESSURE: 108 MMHG

## 2019-11-16 DIAGNOSIS — K52.9 GASTROENTERITIS: ICD-10-CM

## 2019-11-16 LAB
ALBUMIN SERPL BCP-MCNC: 4.5 G/DL (ref 3.2–4.9)
ALBUMIN/GLOB SERPL: 1.7 G/DL
ALP SERPL-CCNC: 80 U/L (ref 30–99)
ALT SERPL-CCNC: 27 U/L (ref 2–50)
ANION GAP SERPL CALC-SCNC: 9 MMOL/L (ref 0–11.9)
APPEARANCE UR: CLEAR
AST SERPL-CCNC: 18 U/L (ref 12–45)
BASOPHILS # BLD AUTO: 0.4 % (ref 0–1.8)
BASOPHILS # BLD: 0.05 K/UL (ref 0–0.12)
BILIRUB SERPL-MCNC: 1.5 MG/DL (ref 0.1–1.5)
BILIRUB UR QL STRIP.AUTO: NEGATIVE
BUN SERPL-MCNC: 13 MG/DL (ref 8–22)
CALCIUM SERPL-MCNC: 9 MG/DL (ref 8.5–10.5)
CHLORIDE SERPL-SCNC: 107 MMOL/L (ref 96–112)
CO2 SERPL-SCNC: 23 MMOL/L (ref 20–33)
COLOR UR: YELLOW
CREAT SERPL-MCNC: 0.83 MG/DL (ref 0.5–1.4)
EOSINOPHIL # BLD AUTO: 0.18 K/UL (ref 0–0.51)
EOSINOPHIL NFR BLD: 1.4 % (ref 0–6.9)
ERYTHROCYTE [DISTWIDTH] IN BLOOD BY AUTOMATED COUNT: 39.3 FL (ref 35.9–50)
GLOBULIN SER CALC-MCNC: 2.6 G/DL (ref 1.9–3.5)
GLUCOSE SERPL-MCNC: 90 MG/DL (ref 65–99)
GLUCOSE UR STRIP.AUTO-MCNC: NEGATIVE MG/DL
HCT VFR BLD AUTO: 52 % (ref 42–52)
HGB BLD-MCNC: 17.9 G/DL (ref 14–18)
IMM GRANULOCYTES # BLD AUTO: 0.05 K/UL (ref 0–0.11)
IMM GRANULOCYTES NFR BLD AUTO: 0.4 % (ref 0–0.9)
KETONES UR STRIP.AUTO-MCNC: NEGATIVE MG/DL
LACTATE BLD-SCNC: 1.8 MMOL/L (ref 0.5–2)
LEUKOCYTE ESTERASE UR QL STRIP.AUTO: NEGATIVE
LIPASE SERPL-CCNC: 23 U/L (ref 11–82)
LYMPHOCYTES # BLD AUTO: 1.42 K/UL (ref 1–4.8)
LYMPHOCYTES NFR BLD: 10.9 % (ref 22–41)
MCH RBC QN AUTO: 29.2 PG (ref 27–33)
MCHC RBC AUTO-ENTMCNC: 34.4 G/DL (ref 33.7–35.3)
MCV RBC AUTO: 84.7 FL (ref 81.4–97.8)
MICRO URNS: NORMAL
MONOCYTES # BLD AUTO: 0.74 K/UL (ref 0–0.85)
MONOCYTES NFR BLD AUTO: 5.7 % (ref 0–13.4)
NEUTROPHILS # BLD AUTO: 10.56 K/UL (ref 1.82–7.42)
NEUTROPHILS NFR BLD: 81.2 % (ref 44–72)
NITRITE UR QL STRIP.AUTO: NEGATIVE
NRBC # BLD AUTO: 0 K/UL
NRBC BLD-RTO: 0 /100 WBC
PH UR STRIP.AUTO: 5 [PH] (ref 5–8)
PLATELET # BLD AUTO: 285 K/UL (ref 164–446)
PMV BLD AUTO: 9.7 FL (ref 9–12.9)
POTASSIUM SERPL-SCNC: 4.3 MMOL/L (ref 3.6–5.5)
PROT SERPL-MCNC: 7.1 G/DL (ref 6–8.2)
PROT UR QL STRIP: NEGATIVE MG/DL
RBC # BLD AUTO: 6.14 M/UL (ref 4.7–6.1)
RBC UR QL AUTO: NEGATIVE
SODIUM SERPL-SCNC: 139 MMOL/L (ref 135–145)
SP GR UR STRIP.AUTO: 1.02
UROBILINOGEN UR STRIP.AUTO-MCNC: 0.2 MG/DL
WBC # BLD AUTO: 13 K/UL (ref 4.8–10.8)

## 2019-11-16 PROCEDURE — 96374 THER/PROPH/DIAG INJ IV PUSH: CPT | Mod: XU

## 2019-11-16 PROCEDURE — 85025 COMPLETE CBC W/AUTO DIFF WBC: CPT

## 2019-11-16 PROCEDURE — 700111 HCHG RX REV CODE 636 W/ 250 OVERRIDE (IP): Performed by: EMERGENCY MEDICINE

## 2019-11-16 PROCEDURE — 700117 HCHG RX CONTRAST REV CODE 255: Performed by: EMERGENCY MEDICINE

## 2019-11-16 PROCEDURE — 74177 CT ABD & PELVIS W/CONTRAST: CPT

## 2019-11-16 PROCEDURE — 700105 HCHG RX REV CODE 258: Performed by: EMERGENCY MEDICINE

## 2019-11-16 PROCEDURE — 81003 URINALYSIS AUTO W/O SCOPE: CPT

## 2019-11-16 PROCEDURE — 80053 COMPREHEN METABOLIC PANEL: CPT

## 2019-11-16 PROCEDURE — 96375 TX/PRO/DX INJ NEW DRUG ADDON: CPT

## 2019-11-16 PROCEDURE — 83690 ASSAY OF LIPASE: CPT

## 2019-11-16 PROCEDURE — 99285 EMERGENCY DEPT VISIT HI MDM: CPT

## 2019-11-16 PROCEDURE — 83605 ASSAY OF LACTIC ACID: CPT

## 2019-11-16 RX ORDER — PROMETHAZINE HYDROCHLORIDE 25 MG/1
25 TABLET ORAL EVERY 6 HOURS PRN
Qty: 30 TAB | Refills: 0 | Status: SHIPPED | OUTPATIENT
Start: 2019-11-16 | End: 2019-12-11

## 2019-11-16 RX ORDER — METRONIDAZOLE 500 MG/1
500 TABLET ORAL EVERY 8 HOURS
Qty: 30 TAB | Refills: 0 | Status: SHIPPED | OUTPATIENT
Start: 2019-11-16 | End: 2019-11-26

## 2019-11-16 RX ORDER — KETOROLAC TROMETHAMINE 30 MG/ML
30 INJECTION, SOLUTION INTRAMUSCULAR; INTRAVENOUS ONCE
Status: COMPLETED | OUTPATIENT
Start: 2019-11-16 | End: 2019-11-16

## 2019-11-16 RX ORDER — SODIUM CHLORIDE 9 MG/ML
1000 INJECTION, SOLUTION INTRAVENOUS ONCE
Status: COMPLETED | OUTPATIENT
Start: 2019-11-16 | End: 2019-11-16

## 2019-11-16 RX ORDER — ONDANSETRON 2 MG/ML
4 INJECTION INTRAMUSCULAR; INTRAVENOUS ONCE
Status: COMPLETED | OUTPATIENT
Start: 2019-11-16 | End: 2019-11-16

## 2019-11-16 RX ADMIN — SODIUM CHLORIDE 1000 ML: 9 INJECTION, SOLUTION INTRAVENOUS at 11:19

## 2019-11-16 RX ADMIN — IOHEXOL 100 ML: 350 INJECTION, SOLUTION INTRAVENOUS at 12:26

## 2019-11-16 RX ADMIN — KETOROLAC TROMETHAMINE 30 MG: 30 INJECTION, SOLUTION INTRAMUSCULAR; INTRAVENOUS at 11:19

## 2019-11-16 RX ADMIN — ONDANSETRON 4 MG: 2 INJECTION INTRAMUSCULAR; INTRAVENOUS at 11:19

## 2019-11-16 NOTE — ED PROVIDER NOTES
ED Provider Note    Scribed for Desiree Oneal M.D. by Serene Hawk. 11/16/2019  10:51 AM    Primary care provider: Pcp Pt States None  Means of arrival: Walk-in  History obtained from: Patient  History limited by: None    CHIEF COMPLAINT  Chief Complaint   Patient presents with   • Diarrhea   • Abdominal Pain     HPI  Yina Oconnor is a 24 y.o. male who presents to the Emergency Department for abdominal pain that began a week ago. He reports associated diarrhea with mucous as well as fever and chills. He denies bloody stools. The patient had an abscess near a tooth and was on antibiotics for 3 days. He stopped taking the antibiotic after his medications ran out. The patient has not traveled outside of the country recently. He denies smoking. He does drink alcohol occasionally. The patient states he was using methamphetamine intravenously, but stopped using it two weeks ago. The patient denies allergies.    REVIEW OF SYSTEMS  HEENT:  No ear pain, congestion, or sore throat   EYES: no discharge, redness, or vision changes  CARDIAC: no chest pain, no palpitations    PULMONARY: no dyspnea, cough, or congestion   GI: Abdominal pain, diarrhea with mucous, no vomiting or bloody stools  : no dysuria, back pain, or hematuria   Neuro: no weakness, numbness, aphasia, or headache  Musculoskeletal: no swelling, deformity, pain, or joint swelling  Endocrine:  Fevers and chills, No sweating, or weight loss   SKIN: no rash, erythema, or contusions     See history of present illness. All other systems are negative. C.    PAST MEDICAL HISTORY  No pertinent past medical history.    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 No pertinent family history.    CURRENT MEDICATIONS  Current Outpatient Medications:   •  acetaminophen (TYLENOL) 325 MG Tab, Take 1 Tab by mouth every four hours as needed., Disp: 30 Tab, Rfl: 0  •  ibuprofen (MOTRIN) 600 MG Tab, Take 1 Tab by mouth every 6 hours as needed., Disp: 30 Tab, Rfl: 0  •  ondansetron (ZOFRAN ODT) 4 MG TABLET DISPERSIBLE, Take 1 Tab by mouth every 8 hours as needed., Disp: 10 Tab, Rfl: 0  •  hydrocodone-acetaminophen (NORCO) 5-325 MG TABS per tablet, Take 1-2 Tabs by mouth every four hours as needed., Disp: 20 Tab, Rfl: 0     ALLERGIES  Allergies   Allergen Reactions   • Zinc Swelling     PHYSICAL EXAM  VITAL SIGNS: /69   Pulse 85   Temp 36 °C (96.8 °F) (Temporal)   Resp 16   Ht 1.676 m (5' 6\")   Wt 94.3 kg (207 lb 14.3 oz)   SpO2 95%   BMI 33.55 kg/m²     Constitutional: Appears uncomfortable, Well developed, Well nourished, No acute distress, Non-toxic appearance.   HEENT: Normocephalic, Atraumatic,  external ears normal, pharynx pink,  Mucous  Membranes dry, No rhinorrhea or mucosal edema  Eyes: PERRL, EOMI, Conjunctiva normal, No discharge.   Neck: Normal range of motion, No tenderness, Supple, No stridor.   Lymphatic: No lymphadenopathy    Cardiovascular: Regular Rate and Rhythm, No murmurs,  rubs, or gallops.   Thorax & Lungs: Lungs clear to auscultation bilaterally, No respiratory distress, No wheezes, rhales or rhonchi, No chest wall tenderness.   Abdomen: Bowel sounds normal, Soft, Diffusely tender, Voluntary guarding, non distended,  No pulsatile masses., no rebound or peritoneal signs.   Skin: Warm, Dry, No erythema, No rash,   Back:  No CVA tenderness,  No spinal tenderness, bony crepitance, step offs, or instability.   Neurologic: Alert & oriented x 3, Normal motor function, Normal sensory function, No focal " deficits noted. Normal reflexes. Normal Cranial Nerves.  Extremities: Equal, intact distal pulses, No cyanosis, clubbing or edema,  No tenderness.   Musculoskeletal: Good range of motion in all major joints. No tenderness to palpation or major deformities noted.     DIAGNOSTIC STUDIES / PROCEDURES    LABS  Results for orders placed or performed during the hospital encounter of 11/16/19   CBC WITH DIFFERENTIAL   Result Value Ref Range    WBC 13.0 (H) 4.8 - 10.8 K/uL    RBC 6.14 (H) 4.70 - 6.10 M/uL    Hemoglobin 17.9 14.0 - 18.0 g/dL    Hematocrit 52.0 42.0 - 52.0 %    MCV 84.7 81.4 - 97.8 fL    MCH 29.2 27.0 - 33.0 pg    MCHC 34.4 33.7 - 35.3 g/dL    RDW 39.3 35.9 - 50.0 fL    Platelet Count 285 164 - 446 K/uL    MPV 9.7 9.0 - 12.9 fL    Neutrophils-Polys 81.20 (H) 44.00 - 72.00 %    Lymphocytes 10.90 (L) 22.00 - 41.00 %    Monocytes 5.70 0.00 - 13.40 %    Eosinophils 1.40 0.00 - 6.90 %    Basophils 0.40 0.00 - 1.80 %    Immature Granulocytes 0.40 0.00 - 0.90 %    Nucleated RBC 0.00 /100 WBC    Neutrophils (Absolute) 10.56 (H) 1.82 - 7.42 K/uL    Lymphs (Absolute) 1.42 1.00 - 4.80 K/uL    Monos (Absolute) 0.74 0.00 - 0.85 K/uL    Eos (Absolute) 0.18 0.00 - 0.51 K/uL    Baso (Absolute) 0.05 0.00 - 0.12 K/uL    Immature Granulocytes (abs) 0.05 0.00 - 0.11 K/uL    NRBC (Absolute) 0.00 K/uL   COMP METABOLIC PANEL   Result Value Ref Range    Sodium 139 135 - 145 mmol/L    Potassium 4.3 3.6 - 5.5 mmol/L    Chloride 107 96 - 112 mmol/L    Co2 23 20 - 33 mmol/L    Anion Gap 9.0 0.0 - 11.9    Glucose 90 65 - 99 mg/dL    Bun 13 8 - 22 mg/dL    Creatinine 0.83 0.50 - 1.40 mg/dL    Calcium 9.0 8.5 - 10.5 mg/dL    AST(SGOT) 18 12 - 45 U/L    ALT(SGPT) 27 2 - 50 U/L    Alkaline Phosphatase 80 30 - 99 U/L    Total Bilirubin 1.5 0.1 - 1.5 mg/dL    Albumin 4.5 3.2 - 4.9 g/dL    Total Protein 7.1 6.0 - 8.2 g/dL    Globulin 2.6 1.9 - 3.5 g/dL    A-G Ratio 1.7 g/dL   LIPASE   Result Value Ref Range    Lipase 23 11 - 82 U/L   LACTIC  ACID   Result Value Ref Range    Lactic Acid 1.8 0.5 - 2.0 mmol/L   URINALYSIS CULTURE, IF INDICATED   Result Value Ref Range    Color Yellow     Character Clear     Specific Gravity 1.025 <1.035    Ph 5.0 5.0 - 8.0    Glucose Negative Negative mg/dL    Ketones Negative Negative mg/dL    Protein Negative Negative mg/dL    Bilirubin Negative Negative    Urobilinogen, Urine 0.2 Negative    Nitrite Negative Negative    Leukocyte Esterase Negative Negative    Occult Blood Negative Negative    Micro Urine Req see below    ESTIMATED GFR   Result Value Ref Range    GFR If African American >60 >60 mL/min/1.73 m 2    GFR If Non African American >60 >60 mL/min/1.73 m 2      All labs reviewed by me.    RADIOLOGY  CT-ABDOMEN-PELVIS WITH   Final Result      Air-fluid levels in the colon can be seen in the setting of diarrhea.      Mildly dilated loops of small bowel are fluid-filled and can be seen in the setting of enteritis. Mild small bowel dilatation may represent mild ileus. No evidence of bowel obstruction is identified.      Mild splenomegaly.           The radiologist's interpretation of all radiological studies have been reviewed by me.    COURSE & MEDICAL DECISION MAKING  Nursing notes, VS, PMSFHx reviewed in chart.    10:51 AM Patient seen and examined at bedside. Patient will be treated with NS Infusion 1000 mL, Toradol 30 mg, and Zofran 4 mg. {Itravenous fluids administered for acute diarrhea and dehydration. Ordered CT-Abdomen-Pelvis, Lactic Acid, UA Culture, if Indicated, Estimated GFR, CBC with Differential, CMP, and Lipase to evaluate his symptoms. The differential diagnoses include but are not limited to gastroenteritis, pancreatitis, colitis, dehydration    12:38 PM - Reviewed lab and radiology results.    12:40 PM - Ordered Stool WBC's, Culture Stool, and C Diff by PCR rflx Toxin.     12:43 PM - I reevaluated the patient at bedside. I updated him on the findings and told him of the plan for stool tests.  His  repeat abdominal exam is improved and benign.  Wishes to be discharged and feels better after the IV fluid.  I told him of the plan for discharge and provided strict return precautions. He understands and agrees.     HYDRATION: Based on the patient's presentation of Acute Diarrhea and Dehydration the patient was given IV fluids. IV Hydration was used because oral hydration was not adequate alone. Upon recheck following hydration, the patient was improved.          DISPOSITION:  Patient will be discharged home in stable condition.    FOLLOW UP:  [address] [address] [phone]  Call in 2 days  to establish care, for recheck      OUTPATIENT MEDICATIONS:  Discharge Medication List as of 11/16/2019  1:26 PM      START taking these medications    Details   metroNIDAZOLE (FLAGYL) 500 MG Tab Take 1 Tab by mouth every 8 hours for 10 days., Disp-30 Tab, R-0, Print Rx Paper      promethazine (PHENERGAN) 25 MG Tab Take 1 Tab by mouth every 6 hours as needed for Nausea/Vomiting., Disp-30 Tab, R-0, Print Rx Paper            FINAL IMPRESSION  1. Gastroenteritis          Serene CRAIN (Scribe), am scribing for, and in the presence of, Desiree Oneal M.D..    Electronically signed by: Serene Hawk (Sameer), 11/16/2019    Desiree CRAIN M.D. personally performed the services described in this documentation, as scribed by Serene Hawk in my presence, and it is both accurate and complete. C    The note accurately reflects work and decisions made by me.  Desiree Oneal  11/16/2019  1:45 PM

## 2019-11-16 NOTE — ED NOTES
Provided clean catch urine sample. Now on monitor receiving end of fluids. Provided warm blanket denies other needs.

## 2019-11-16 NOTE — ED NOTES
Pt discharged at this time. Given all prescriptions and instructions. Verbalize understanding of all dc instructions. Released to self via POV. IV discontinued catheter tip intact.

## 2019-12-11 ENCOUNTER — HOSPITAL ENCOUNTER (EMERGENCY)
Facility: MEDICAL CENTER | Age: 24
End: 2019-12-11
Attending: EMERGENCY MEDICINE
Payer: MEDICAID

## 2019-12-11 VITALS
HEART RATE: 86 BPM | OXYGEN SATURATION: 98 % | TEMPERATURE: 97.2 F | DIASTOLIC BLOOD PRESSURE: 84 MMHG | RESPIRATION RATE: 16 BRPM | BODY MASS INDEX: 31.07 KG/M2 | SYSTOLIC BLOOD PRESSURE: 127 MMHG | WEIGHT: 197.97 LBS | HEIGHT: 67 IN

## 2019-12-11 DIAGNOSIS — K08.89 PAIN, DENTAL: ICD-10-CM

## 2019-12-11 PROCEDURE — 99283 EMERGENCY DEPT VISIT LOW MDM: CPT

## 2019-12-11 PROCEDURE — A9270 NON-COVERED ITEM OR SERVICE: HCPCS | Performed by: EMERGENCY MEDICINE

## 2019-12-11 PROCEDURE — 700102 HCHG RX REV CODE 250 W/ 637 OVERRIDE(OP): Performed by: EMERGENCY MEDICINE

## 2019-12-11 RX ORDER — PENICILLIN V POTASSIUM 500 MG/1
500 TABLET ORAL
Qty: 40 TAB | Refills: 0 | Status: SHIPPED | OUTPATIENT
Start: 2019-12-11 | End: 2019-12-21

## 2019-12-11 RX ORDER — PENICILLIN V POTASSIUM 500 MG/1
500 TABLET ORAL ONCE
Status: COMPLETED | OUTPATIENT
Start: 2019-12-11 | End: 2019-12-11

## 2019-12-11 RX ORDER — IBUPROFEN 600 MG/1
600 TABLET ORAL ONCE
Status: COMPLETED | OUTPATIENT
Start: 2019-12-11 | End: 2019-12-11

## 2019-12-11 RX ORDER — IBUPROFEN 600 MG/1
600 TABLET ORAL EVERY 6 HOURS PRN
Qty: 20 TAB | Refills: 0 | Status: SHIPPED | OUTPATIENT
Start: 2019-12-11

## 2019-12-11 RX ADMIN — PENICILLIN V POTASSIUM 500 MG: 500 TABLET, FILM COATED ORAL at 14:00

## 2019-12-11 RX ADMIN — IBUPROFEN 600 MG: 600 TABLET ORAL at 14:00

## 2019-12-11 ASSESSMENT — LIFESTYLE VARIABLES: DO YOU DRINK ALCOHOL: NO

## 2019-12-11 NOTE — ED TRIAGE NOTES
Yina Caldera [place]  Chief Complaint   Patient presents with   • Dental Pain     and swelling to right lower mouth/ jaw     Pt ambulatory to triage with above complaint.  VSS, no acute distress noted.   States lower right-sided broken tooth.  Pt returned to lobby, educated on triage process, and to inform staff of any changes or concerns.

## 2019-12-11 NOTE — ED PROVIDER NOTES
ED Provider Note    CHIEF COMPLAINT  Chief Complaint   Patient presents with   • Dental Pain     and swelling to right lower mouth/ jaw       HPI  Yina Oconnor is a 24 y.o. male who presents complaining of toothache.  It hurts along the lower right jawline.  It has been present for last couple days, but is particularly bad at this time.  Pain radiates locally.  Rates pain as moderate to severe.  It is worse to chew or bite down.  However, patient is able to take orals.  No associated breathing/swallowing difficulty.  No fever.  No nausea, vomiting, chest pain, shortness of breath, weakness, numbness, bowel or bladder changes. There are no other complaints.    PAST MEDICAL HISTORY  History reviewed. No pertinent past medical history.    FAMILY HISTORY  History reviewed. No pertinent family history.    SOCIAL HISTORY  Social History     Socioeconomic History   • Marital status: Single     Spouse name: Not on file   • Number of children: Not on file   • Years of education: Not on file   • Highest education level: Not on file   Occupational History   • Not on file   Social Needs   • Financial resource strain: Not on file   • Food insecurity:     Worry: Not on file     Inability: Not on file   • Transportation needs:     Medical: Not on file     Non-medical: Not on file   Tobacco Use   • Smoking status: Former Smoker     Packs/day: 0.25     Types: Cigarettes   • Smokeless tobacco: Never Used   Substance and Sexual Activity   • Alcohol use: Yes     Comment: occ   • Drug use: Not Currently     Comment: not in last month   • Sexual activity: Not on file   Lifestyle   • Physical activity:     Days per week: Not on file     Minutes per session: Not on file   • Stress: Not on file   Relationships   • Social connections:     Talks on phone: Not on file     Gets together: Not on file     Attends Islam service: Not on file     Active member of club or organization: Not on file     Attends meetings of clubs or  "organizations: Not on file     Relationship status: Not on file   • Intimate partner violence:     Fear of current or ex partner: Not on file     Emotionally abused: Not on file     Physically abused: Not on file     Forced sexual activity: Not on file   Other Topics Concern   • Not on file   Social History Narrative    ** Merged History Encounter **            SURGICAL HISTORY  History reviewed. No pertinent surgical history.    CURRENT MEDICATIONS  Home Medications     Reviewed by Mally Madison R.N. (Registered Nurse) on 12/11/19 at 1247  Med List Status: Complete   Medication Last Dose Status        Patient Jacob Taking any Medications                       ALLERGIES  Allergies   Allergen Reactions   • Zinc Swelling       REVIEW OF SYSTEMS  See HPI for further details. Review of systems as above, otherwise all other systems are negative.     PHYSICAL EXAM  VITAL SIGNS: /84   Pulse 86   Temp 36.2 °C (97.2 °F) (Temporal)   Resp 16   Ht 1.702 m (5' 7\")   Wt 89.8 kg (197 lb 15.6 oz)   SpO2 98%   BMI 31.01 kg/m²     Constitutional: Well appearing patient in mild distress from pain.  Not toxic, nor ill in appearance.  HENT: Mucus membranes moist.  Oropharynx is clear.  There are scattered caries.  There is minimal right facial edema along the mandible.  There is tenderness over tooth #29 and 30.  Tongue is normal.  Floor of the mouth is normal.  Submental space is soft.  Posterior pharynx is normal.  Patient is tolerating secretions without difficulty.  Eyes: Pupils equally round.  No scleral icterus.   Neck: Full nontender range of motion; no meningismus.  Lymphatic: No cervical lymphadenopathy noted.   Cardiovascular: Regular heart rate and rhythm.  No murmurs, rubs, nor gallop appreciated.   Thorax & Lungs: Lungs are clear to auscultation with good air movement bilaterally.  No wheeze, rhonchi, nor rales.   Abdomen: Soft, not distended  Skin: No facial cellulitis.  Extremities/Musculoskeletal: No " sign of trauma.  Musculoskeletal: Grossly normal.  Neurologic: Alert & appropriate.  Strength is grossly normal.  Gait is normal about the ER.  Psychiatric: Normal affect appropriate for the clinical situation.    PROCEDURES      MEDICAL RECORD  I have reviewed patient's medical record and pertinent results are listed above.    COURSE & MEDICAL DECISION MAKING  I have reviewed any medical record information, laboratory studies and radiographic results as noted above.    This patient presents with a toothache.  There is no obvious dental abscess at this time which I can drain.  I am prescribing the patient narcotic pain pills and antibiotics.  They are asked to follow up with a dentist at soonest possibility.  They are counseled to return for increasing pain or swelling, breathing/swallowing difficulty, fever, any other concern at all.  They are given aftercare instructions on toothache, a dental referral sheet, and narcotic cautions.      The patient will return for new or worsening symptoms and is stable at the time of discharge.    The patient is referred to a primary physician for blood pressure management, diabetic screening, and for all other preventative health concerns.    DISPOSITION:  Patient will be discharged home in stable condition.    FOLLOW UP:  [facility], Emergency Dept  [address] [facility] [id number] [phone]    If symptoms worsen    Dimitri Guerra M.D.  [address] [place] [address] [address]  [facility] [address]  [phone]    Schedule an appointment as soon as possible for a visit         OUTPATIENT MEDICATIONS:  New Prescriptions    IBUPROFEN (MOTRIN) 600 MG TAB    Take 1 Tab by mouth every 6 hours as needed.    PENICILLIN V POTASSIUM (VEETID) 500 MG TAB    Take 1 Tab by mouth 4 Times a Day,Before Meals and at Bedtime for 10 days.       FINAL IMPRESSION  1. Pain, dental             Electronically signed by: Efrem Hunter, 12/11/2019 1:37 PM

## 2019-12-11 NOTE — ED NOTES
Discharge, Follow Up, and Rx education provided to patient who verbalizes understanding   Patient denies further questions at this time   Wheelchair was offered to the waiting room and refused by patient  Patient ambulated to the ED Lobby with steady gait

## 2019-12-21 ENCOUNTER — HOSPITAL ENCOUNTER (EMERGENCY)
Facility: MEDICAL CENTER | Age: 24
End: 2019-12-22
Attending: EMERGENCY MEDICINE
Payer: MEDICAID

## 2019-12-21 DIAGNOSIS — R45.89 SITUATIONAL DISTURBANCE OF SELF-ESTEEM: ICD-10-CM

## 2019-12-21 LAB — POC BREATHALIZER: 0 PERCENT (ref 0–0.01)

## 2019-12-21 PROCEDURE — 302970 POC BREATHALIZER: Performed by: EMERGENCY MEDICINE

## 2019-12-21 PROCEDURE — 302970 POC BREATHALIZER

## 2019-12-21 PROCEDURE — 90791 PSYCH DIAGNOSTIC EVALUATION: CPT

## 2019-12-21 PROCEDURE — 99285 EMERGENCY DEPT VISIT HI MDM: CPT

## 2019-12-22 VITALS
DIASTOLIC BLOOD PRESSURE: 70 MMHG | RESPIRATION RATE: 18 BRPM | HEART RATE: 75 BPM | HEIGHT: 67 IN | SYSTOLIC BLOOD PRESSURE: 111 MMHG | BODY MASS INDEX: 30.61 KG/M2 | OXYGEN SATURATION: 98 % | TEMPERATURE: 97.3 F | WEIGHT: 195 LBS

## 2019-12-22 NOTE — ED NOTES
Per Cassius with alert team, he is recommending we discontinue hold, and will notify Dr. Martinez.
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
## 2019-12-22 NOTE — ED NOTES
Discharge paperwork and instruction provided. Pt verbalized understanding, then dressed self and ambulated out of ED with steady gait, alert and oriented.

## 2019-12-22 NOTE — ED NOTES
Pt's belongings and potentially dangerous items removed from room. Belongings searched by security, then secured in locker 5 (two bags). Pt in hospital gown only. Pt verbalized understanding of legal hold. Pt resting comfortably in bed, respirations even and unlabored. Venu dempseyby, outside room.

## 2020-07-05 ENCOUNTER — HOSPITAL ENCOUNTER (EMERGENCY)
Dept: HOSPITAL 8 - ED | Age: 25
Discharge: HOME | End: 2020-07-05
Payer: MEDICAID

## 2020-07-05 VITALS — SYSTOLIC BLOOD PRESSURE: 109 MMHG | DIASTOLIC BLOOD PRESSURE: 65 MMHG

## 2020-07-05 VITALS — WEIGHT: 189.16 LBS | HEIGHT: 67 IN | BODY MASS INDEX: 29.69 KG/M2

## 2020-07-05 DIAGNOSIS — R10.11: Primary | ICD-10-CM

## 2020-07-05 DIAGNOSIS — R10.13: ICD-10-CM

## 2020-07-05 DIAGNOSIS — F17.200: ICD-10-CM

## 2020-07-05 LAB
ALBUMIN SERPL-MCNC: 3.2 G/DL (ref 3.4–5)
ALP SERPL-CCNC: 85 U/L (ref 45–117)
ALT SERPL-CCNC: 33 U/L (ref 12–78)
ANION GAP SERPL CALC-SCNC: 4 MMOL/L (ref 5–15)
BASOPHILS # BLD AUTO: 0.03 X10^3/UL (ref 0–0.1)
BASOPHILS NFR BLD AUTO: 1 % (ref 0–1)
BILIRUB SERPL-MCNC: 2 MG/DL (ref 0.2–1)
CALCIUM SERPL-MCNC: 8.4 MG/DL (ref 8.5–10.1)
CHLORIDE SERPL-SCNC: 109 MMOL/L (ref 98–107)
CREAT SERPL-MCNC: 0.91 MG/DL (ref 0.7–1.3)
EOSINOPHIL # BLD AUTO: 0.1 X10^3/UL (ref 0–0.4)
EOSINOPHIL NFR BLD AUTO: 2 % (ref 1–7)
ERYTHROCYTE [DISTWIDTH] IN BLOOD BY AUTOMATED COUNT: 13.6 % (ref 9.4–14.8)
LYMPHOCYTES # BLD AUTO: 1.62 X10^3/UL (ref 1–3.4)
LYMPHOCYTES NFR BLD AUTO: 27 % (ref 22–44)
MCH RBC QN AUTO: 29.3 PG (ref 27.5–34.5)
MCHC RBC AUTO-ENTMCNC: 33.9 G/DL (ref 33.2–36.2)
MCV RBC AUTO: 86.4 FL (ref 81–97)
MD: NO
MONOCYTES # BLD AUTO: 0.86 X10^3/UL (ref 0.2–0.8)
MONOCYTES NFR BLD AUTO: 14 % (ref 2–9)
NEUTROPHILS # BLD AUTO: 3.43 X10^3/UL (ref 1.8–6.8)
NEUTROPHILS NFR BLD AUTO: 57 % (ref 42–75)
PLATELET # BLD AUTO: 191 X10^3/UL (ref 130–400)
PMV BLD AUTO: 8.4 FL (ref 7.4–10.4)
PROT SERPL-MCNC: 7.1 G/DL (ref 6.4–8.2)
RBC # BLD AUTO: 4.93 X10^6/UL (ref 4.38–5.82)

## 2020-07-05 PROCEDURE — 99284 EMERGENCY DEPT VISIT MOD MDM: CPT

## 2020-07-05 PROCEDURE — 80053 COMPREHEN METABOLIC PANEL: CPT

## 2020-07-05 PROCEDURE — 83690 ASSAY OF LIPASE: CPT

## 2020-07-05 PROCEDURE — 76700 US EXAM ABDOM COMPLETE: CPT

## 2020-07-05 PROCEDURE — 36415 COLL VENOUS BLD VENIPUNCTURE: CPT

## 2020-07-05 PROCEDURE — 96372 THER/PROPH/DIAG INJ SC/IM: CPT

## 2020-07-05 PROCEDURE — 85025 COMPLETE CBC W/AUTO DIFF WBC: CPT

## 2020-07-05 NOTE — NUR
This is a 25 yo male coming in for diffuse upper and lower abd pain x2 days, Pt 
states pain when taking deep breath, no other resp sx. Lung sounds clear 
throughout, respirations even and unlabored. Pt denies N/V or dysuria, states 
one episode of diarrhea today. Abdomen tender to plapation. Pt states IV meth 
use, last use yesterday. A&O x4, VSS, monitoring in place, NADN. Call light in 
reach.

## 2021-07-15 ENCOUNTER — HOSPITAL ENCOUNTER (EMERGENCY)
Facility: MEDICAL CENTER | Age: 26
End: 2021-07-15
Payer: MEDICAID

## 2021-07-15 VITALS — HEIGHT: 68 IN | WEIGHT: 187.83 LBS | BODY MASS INDEX: 28.47 KG/M2

## 2021-07-15 PROCEDURE — 302449 STATCHG TRIAGE ONLY (STATISTIC)

## 2021-07-15 ASSESSMENT — FIBROSIS 4 INDEX: FIB4 SCORE: 0.29

## 2021-07-16 NOTE — ED TRIAGE NOTES
"Chief Complaint   Patient presents with   • Tooth Abscess     Pt states he has a tooth abscess on the left bottom. Pt noticed the swelling yesterday. Pt hoping to get some abx.      Ht 1.727 m (5' 8\")   Wt 85.2 kg (187 lb 13.3 oz)   BMI 28.56 kg/m²     Patient arrived to ED for the above complaint. Triage process explained to patient. Patient placed in lobby and told to notify staff of any changes.   "

## 2021-07-17 ENCOUNTER — HOSPITAL ENCOUNTER (EMERGENCY)
Dept: HOSPITAL 8 - ED | Age: 26
Discharge: HOME | End: 2021-07-17
Payer: MEDICAID

## 2021-07-17 VITALS — WEIGHT: 197.09 LBS | HEIGHT: 67 IN | BODY MASS INDEX: 30.93 KG/M2

## 2021-07-17 VITALS — DIASTOLIC BLOOD PRESSURE: 73 MMHG | SYSTOLIC BLOOD PRESSURE: 127 MMHG

## 2021-07-17 DIAGNOSIS — K02.9: Primary | ICD-10-CM

## 2021-07-17 DIAGNOSIS — K08.89: ICD-10-CM

## 2021-07-17 DIAGNOSIS — F17.210: ICD-10-CM

## 2021-07-17 PROCEDURE — 99406 BEHAV CHNG SMOKING 3-10 MIN: CPT

## 2021-07-17 NOTE — NUR
Patientgiven discharge instructions and they have confirmed that they 
understand the instructions.  Patient ambulatory with steady gait. NAD, all 
questions answered appropriately, denies additional needs at this time. No 
personal belongings left in room after discharge.

## 2021-07-17 NOTE — NUR
FIRST CONTACT:TOOTH PAIN X 1 WEEK. PT TO ROOM WITH STEADY GAIT. FORTINO LANGSTON. 
HUBERT PA TO BEDSIDE FOR EVALUATION

## 2021-11-18 ENCOUNTER — HOSPITAL ENCOUNTER (EMERGENCY)
Facility: MEDICAL CENTER | Age: 26
End: 2021-11-18
Payer: MEDICAID

## 2021-11-18 VITALS
TEMPERATURE: 98.2 F | SYSTOLIC BLOOD PRESSURE: 117 MMHG | OXYGEN SATURATION: 96 % | DIASTOLIC BLOOD PRESSURE: 78 MMHG | RESPIRATION RATE: 16 BRPM | HEART RATE: 103 BPM

## 2021-11-18 PROCEDURE — 302449 STATCHG TRIAGE ONLY (STATISTIC)

## 2021-11-19 ENCOUNTER — HOSPITAL ENCOUNTER (EMERGENCY)
Facility: MEDICAL CENTER | Age: 26
End: 2021-11-19
Attending: EMERGENCY MEDICINE
Payer: MEDICAID

## 2021-11-19 VITALS
BODY MASS INDEX: 28.25 KG/M2 | HEIGHT: 67 IN | RESPIRATION RATE: 16 BRPM | TEMPERATURE: 97.2 F | SYSTOLIC BLOOD PRESSURE: 145 MMHG | OXYGEN SATURATION: 99 % | DIASTOLIC BLOOD PRESSURE: 95 MMHG | WEIGHT: 180 LBS | HEART RATE: 100 BPM

## 2021-11-19 DIAGNOSIS — L03.012 CELLULITIS OF FINGER OF LEFT HAND: ICD-10-CM

## 2021-11-19 PROCEDURE — A9270 NON-COVERED ITEM OR SERVICE: HCPCS | Performed by: EMERGENCY MEDICINE

## 2021-11-19 PROCEDURE — 303977 HCHG I & D

## 2021-11-19 PROCEDURE — A6403 STERILE GAUZE>16 <= 48 SQ IN: HCPCS

## 2021-11-19 PROCEDURE — 99283 EMERGENCY DEPT VISIT LOW MDM: CPT

## 2021-11-19 PROCEDURE — 700111 HCHG RX REV CODE 636 W/ 250 OVERRIDE (IP): Performed by: EMERGENCY MEDICINE

## 2021-11-19 PROCEDURE — 700102 HCHG RX REV CODE 250 W/ 637 OVERRIDE(OP): Performed by: EMERGENCY MEDICINE

## 2021-11-19 RX ORDER — CEPHALEXIN 500 MG/1
500 CAPSULE ORAL 4 TIMES DAILY
Qty: 28 CAPSULE | Refills: 0 | Status: SHIPPED | OUTPATIENT
Start: 2021-11-19 | End: 2021-11-26

## 2021-11-19 RX ORDER — SULFAMETHOXAZOLE AND TRIMETHOPRIM 800; 160 MG/1; MG/1
1 TABLET ORAL 2 TIMES DAILY
Qty: 14 TABLET | Refills: 0 | Status: SHIPPED | OUTPATIENT
Start: 2021-11-19 | End: 2021-11-26

## 2021-11-19 RX ORDER — BUPIVACAINE HYDROCHLORIDE 2.5 MG/ML
10 INJECTION, SOLUTION EPIDURAL; INFILTRATION; INTRACAUDAL ONCE
Status: COMPLETED | OUTPATIENT
Start: 2021-11-19 | End: 2021-11-19

## 2021-11-19 RX ORDER — CEPHALEXIN 500 MG/1
500 CAPSULE ORAL ONCE
Status: COMPLETED | OUTPATIENT
Start: 2021-11-19 | End: 2021-11-19

## 2021-11-19 RX ORDER — SULFAMETHOXAZOLE AND TRIMETHOPRIM 800; 160 MG/1; MG/1
1 TABLET ORAL ONCE
Status: COMPLETED | OUTPATIENT
Start: 2021-11-19 | End: 2021-11-19

## 2021-11-19 RX ADMIN — SULFAMETHOXAZOLE AND TRIMETHOPRIM 1 TABLET: 800; 160 TABLET ORAL at 03:33

## 2021-11-19 RX ADMIN — BUPIVACAINE HYDROCHLORIDE 10 ML: 2.5 INJECTION, SOLUTION EPIDURAL; INFILTRATION; INTRACAUDAL; PERINEURAL at 03:45

## 2021-11-19 RX ADMIN — CEPHALEXIN 500 MG: 500 CAPSULE ORAL at 03:33

## 2021-11-19 NOTE — ED PROVIDER NOTES
"ER Provider Note     Scribed for Calvin Conklin M.D. by Olivier Clifton. 11/19/2021, 3:08 AM.    Primary Care Provider: Pcp Pt States None  Means of Arrival: Walk-in   History obtained from: Patient  History limited by: None     CHIEF COMPLAINT  Chief Complaint   Patient presents with    Hand Swelling     L thumb and hand swelling X 3 days    Hand Pain     associated hand 10/10, \"feels like thumb is going to explode\"     HPI  Yina Oconnor is a 26 y.o. male who presents to the Emergency Department for acute worsening left hand swelling onset 3 days ago. He states his thumb was punctured by an IV needle, but is unsure if it was used or contained anything. He reports associated hand pain, mostly in the left thumb. He states that the pain radiates up his left arm and that he is unable to flex his left thumb. He denies associated arm injury, fever, chills, nausea, vomiting, diarrhea. The patient admits to intravenous drug use.     REVIEW OF SYSTEMS  See HPI for further details.    PAST MEDICAL HISTORY   None noted.    SURGICAL HISTORY  patient denies any surgical history    SOCIAL HISTORY  Social History     Tobacco Use    Smoking status: Current Some Day Smoker     Packs/day: 0.25     Types: Cigarettes    Smokeless tobacco: Never Used   Vaping Use    Vaping Use: Former   Substance Use Topics    Alcohol use: Yes     Comment: occ    Drug use: Not Currently     Types: Intravenous     Comment: meth       Social History     Substance and Sexual Activity   Drug Use Not Currently    Types: Intravenous    Comment: meth      FAMILY HISTORY  History reviewed. No pertinent family history.    CURRENT MEDICATIONS  Home Medications       Reviewed by Katherine Vega R.N. (Registered Nurse) on 11/19/21 at 0203  Med List Status: Not Addressed     Medication Last Dose Status   ibuprofen (MOTRIN) 600 MG Tab  Active                  ALLERGIES  Allergies   Allergen Reactions    Zinc Swelling     PHYSICAL EXAM  VITAL SIGNS: /92   " "Pulse 94   Temp 36 °C (96.8 °F) (Temporal)   Resp 18   Ht 1.702 m (5' 7\")   Wt 81.6 kg (180 lb)   SpO2 98%   BMI 28.19 kg/m²    Constitutional: Alert in no apparent distress.  HENT: No signs of trauma, Bilateral external ears normal, Nose normal.   Eyes: Pupils are equal and reactive, Conjunctiva normal, Non-icteric.   Neck: Normal range of motion, No tenderness, Supple, No stridor.  Lymphatic: No lymphadenopathy noted.  Cardiovascular: Regular rate and rhythm, no palpable thrill  Thorax & Lungs: No respiratory distress,  No chest tenderness.  CTAB  Abdomen: Bowel sounds normal, Soft, No tenderness, No masses, No pulsatile masses. No peritoneal signs.  Skin: Warm, Dry, No erythema, No rash.  Back: No bony tenderness, No CVA tenderness.  Extremities: Intact distal pulses, Left thumb: Tenderness to palpation to distal tip. Able to touch thumb to finger without significant pain, Redness to tip of thumb and underlying whiteness at tip of thumb with no fluctuance.  Musculoskeletal: Good range of motion in all major joints. No tenderness to palpation or major deformities noted.   Neurologic: Alert , Normal motor function, Normal sensory function, No focal deficits noted.  Psychiatric: Affect normal, Judgment normal, Mood normal.    DIAGNOSTIC STUDIES / PROCEDURES     Incision and Drainage Procedure Note    Indication: Finger abscess    Procedure: The patient was positioned appropriately and the skin over the incision site was prepped with betadine and draped in a sterile fashion. Local anesthesia was obtained by infiltration using 0.25% Bupivacaine without epinephrine.  An incision was then made over the apex of the lesion and approximately 1 cc of bloody material was expressed. Loculations were not present. The drainage cavity was then dressed with a sterile dressing. The patient’s tetanus status was up to date and did not require a booster dose.    The patient tolerated the procedure well.    Complications: " None    COURSE & MEDICAL DECISION MAKING  Pertinent Labs & Imaging studies reviewed. (See chart for details)    This is a 26 y.o. male that presents with what appears to be an abscess on the tip of the patient's thumb.  We will drain this.  Will perform a digital block and then perform a drainage.  Patient is able to flex without significant pain therefore I do not believe this is flexor tenosynovitis..     3:08 AM - Patient seen and examined at bedside.  Patient will be medicated with Kelfex 500mg and Bactrim -160mg for his symptoms. Patient prepared to incision and drainage procedure.    3:47 AM - Incision and drainage procedure performed now as shown above. I discussed plan for discharge and follow up as outlined below. The patient is stable for discharge at this time and will return for any new or worsening symptoms. Patient verbalizes understanding and support with my plan for discharge.     The abscess was drained.  The patient is feeling much improved.  Will discharge home with antibiotics.    The patient is referred to a primary physician for blood pressure management, diabetic screening, and for all other preventative health concerns.     DISPOSITION:  Patient will be discharged home in stable condition.    FOLLOW UP:  [address] [address] [phone]  Go in 2 days    OUTPATIENT MEDICATIONS:  Discharge Medication List as of 11/19/2021  3:59 AM        START taking these medications    Details   cephALEXin (KEFLEX) 500 MG Cap Take 1 Capsule by mouth 4 times a day for 7 days., Disp-28 Capsule, R-0, Normal      sulfamethoxazole-trimethoprim (BACTRIM DS) 800-160 MG tablet Take 1 Tablet by mouth 2 times a day for 7 days., Disp-14 Tablet, R-0, Normal           FINAL IMPRESSION  1. Cellulitis of finger of left hand    2. Incision and drainage procedure     I, Olivier Clifton (Scribgabbie), am scribing for, and in the presence of, Calvin Conklin M.D..    Electronically  signed by: Olivier Clifton (Scribe), 11/19/2021    I, Calvin Conklin M.D. personally performed the services described in this documentation, as scribed by Olivier Clifton in my presence, and it is both accurate and complete. E.    The note accurately reflects work and decisions made by me.  Calvin Conklin M.D.  11/19/2021  5:55 AM

## 2021-11-19 NOTE — ED NOTES
"Pt discharged home. Pt is A/O x 4, ambulatory with a steady gait. Patient in possession of all belongings. Pt provided discharge education and information pertaining to medications and follow up appointments. Discussed signs and symptoms to return to the ER, patient verbalized understanding. Pt received copy of discharge instructions and verbalized understanding.     /95   Pulse 100   Temp 36.2 °C (97.2 °F) (Temporal)   Resp 16   Ht 1.702 m (5' 7\")   Wt 81.6 kg (180 lb)   SpO2 99%   BMI 28.19 kg/m²     "

## 2021-11-19 NOTE — ED TRIAGE NOTES
"Chief Complaint   Patient presents with   • Hand Swelling     L thumb and hand swelling X 3 days   • Hand Pain     associated hand 10/10, \"feels like thumb is going to explode\"     Pt ambulatory to triage for above. Reports 3 days of worsening pain and swelling to L thumb and hand that is \"moving up my arm\". Thumb and hand normal coloration but visibly swollen. Pt denies injury to the area or chills. Admits to IV drug use.    "